# Patient Record
Sex: MALE | Race: WHITE
[De-identification: names, ages, dates, MRNs, and addresses within clinical notes are randomized per-mention and may not be internally consistent; named-entity substitution may affect disease eponyms.]

---

## 2017-03-02 ENCOUNTER — HOSPITAL ENCOUNTER (OUTPATIENT)
Dept: HOSPITAL 39 - GMAB | Age: 52
Discharge: HOME | End: 2017-03-02
Attending: FAMILY MEDICINE
Payer: MEDICARE

## 2017-03-02 DIAGNOSIS — I10: ICD-10-CM

## 2017-03-02 DIAGNOSIS — Z12.5: Primary | ICD-10-CM

## 2017-03-02 PROCEDURE — 84443 ASSAY THYROID STIM HORMONE: CPT

## 2018-03-05 ENCOUNTER — HOSPITAL ENCOUNTER (OUTPATIENT)
Dept: HOSPITAL 39 - GMAB | Age: 53
End: 2018-03-05
Attending: FAMILY MEDICINE
Payer: MEDICARE

## 2018-03-05 DIAGNOSIS — Z12.5: ICD-10-CM

## 2018-03-05 DIAGNOSIS — I10: Primary | ICD-10-CM

## 2018-03-05 PROCEDURE — 84443 ASSAY THYROID STIM HORMONE: CPT

## 2018-09-14 ENCOUNTER — HOSPITAL ENCOUNTER (OUTPATIENT)
Dept: HOSPITAL 39 - GMAE | Age: 53
End: 2018-09-14
Attending: FAMILY MEDICINE
Payer: MEDICARE

## 2018-09-14 DIAGNOSIS — M79.1: Primary | ICD-10-CM

## 2018-09-14 NOTE — US
EXAM DESCRIPTION: 

Chest:  ULTRASOUND.



CLINICAL HISTORY: 

[place]. Patient has VAD. Pain around injection port over since

falling.



COMPARISON: 

None Available.



TECHNIQUE: 

Transcutaneous scanning: Gray-scale and Doppler modes.



FINDINGS: 

Scanning around the injection port of the VAD. No solid mass.

Posterior shadowing from the injection port. No large

calcifications, parenchymal edema, or distinct cyst or fluid

collection. No abnormal vascularity.



IMPRESSION: No abnormal soft tissue findings by ultrasound of the

tissues around the injection port of the VAD.



Electronically signed by:  Craig Sanchez MD  9/14/2018 4:34 PM CDT

Workstation: [id number]

## 2019-03-07 ENCOUNTER — HOSPITAL ENCOUNTER (OUTPATIENT)
Dept: HOSPITAL 39 - GMAE | Age: 54
End: 2019-03-07
Attending: FAMILY MEDICINE
Payer: MEDICARE

## 2019-03-07 DIAGNOSIS — I10: Primary | ICD-10-CM

## 2020-03-16 ENCOUNTER — HOSPITAL ENCOUNTER (OUTPATIENT)
Age: 55
End: 2020-03-16
Payer: MEDICARE

## 2020-03-16 DIAGNOSIS — I10: Primary | ICD-10-CM
